# Patient Record
Sex: MALE | Race: WHITE | Employment: FULL TIME | URBAN - METROPOLITAN AREA
[De-identification: names, ages, dates, MRNs, and addresses within clinical notes are randomized per-mention and may not be internally consistent; named-entity substitution may affect disease eponyms.]

---

## 2021-10-09 ENCOUNTER — HOSPITAL ENCOUNTER (OUTPATIENT)
Age: 56
Discharge: HOME | End: 2021-10-09

## 2021-10-09 LAB
AFP: 1.8 UG/L
HCG QUANTITATIVE: 2 IU/L
LACTATE DEHYDROGENASE: 215 U/L (ref 135–225)

## 2021-10-09 PROCEDURE — 84702 CHORIONIC GONADOTROPIN TEST: CPT

## 2021-10-09 PROCEDURE — 83615 LACTATE (LD) (LDH) ENZYME: CPT

## 2021-10-09 PROCEDURE — 36415 COLL VENOUS BLD VENIPUNCTURE: CPT

## 2021-10-09 PROCEDURE — 82105 ALPHA-FETOPROTEIN SERUM: CPT

## 2021-10-13 ENCOUNTER — TELEPHONE (OUTPATIENT)
Dept: UROLOGY | Age: 56
End: 2021-10-13

## 2021-10-13 NOTE — TELEPHONE ENCOUNTER
Patient is scheduled for surgery on 10/19 at 2:00PM and for covid testing on 10/15 at 2:00PM. Left voicemail on work # provided with dates, times and instructions and to call back to confirm.

## 2021-10-15 ENCOUNTER — HOSPITAL ENCOUNTER (OUTPATIENT)
Dept: LAB | Age: 56
Setting detail: SPECIMEN
Discharge: HOME | End: 2021-10-15

## 2021-10-15 DIAGNOSIS — Z20.822 COVID-19 RULED OUT BY LABORATORY TESTING: Primary | ICD-10-CM

## 2021-10-15 PROCEDURE — U0003 INFECTIOUS AGENT DETECTION BY NUCLEIC ACID (DNA OR RNA); SEVERE ACUTE RESPIRATORY SYNDROME CORONAVIRUS 2 (SARS-COV-2) (CORONAVIRUS DISEASE [COVID-19]), AMPLIFIED PROBE TECHNIQUE, MAKING USE OF HIGH THROUGHPUT TECHNOLOGIES AS DESCRIBED BY CMS-2020-01-R: HCPCS

## 2021-10-15 PROCEDURE — U0005 INFEC AGEN DETEC AMPLI PROBE: HCPCS

## 2021-10-16 LAB
SARS-COV-2: NORMAL
SARS-COV-2: NOT DETECTED
SOURCE: NORMAL

## 2021-10-19 ENCOUNTER — ANESTHESIA (OUTPATIENT)
Dept: OPERATING ROOM | Age: 56
End: 2021-10-19

## 2021-10-19 ENCOUNTER — ANESTHESIA EVENT (OUTPATIENT)
Dept: OPERATING ROOM | Age: 56
End: 2021-10-19

## 2021-10-19 ENCOUNTER — HOSPITAL ENCOUNTER (OUTPATIENT)
Age: 56
Setting detail: OUTPATIENT SURGERY
Discharge: HOME | End: 2021-10-19
Attending: UROLOGY | Admitting: UROLOGY

## 2021-10-19 VITALS
OXYGEN SATURATION: 98 % | DIASTOLIC BLOOD PRESSURE: 85 MMHG | RESPIRATION RATE: 20 BRPM | SYSTOLIC BLOOD PRESSURE: 118 MMHG | HEART RATE: 87 BPM | WEIGHT: 134 LBS | BODY MASS INDEX: 19.18 KG/M2 | HEIGHT: 70 IN | TEMPERATURE: 97.1 F

## 2021-10-19 VITALS — SYSTOLIC BLOOD PRESSURE: 104 MMHG | OXYGEN SATURATION: 99 % | TEMPERATURE: 96.1 F | DIASTOLIC BLOOD PRESSURE: 65 MMHG

## 2021-10-19 DIAGNOSIS — G89.18 POSTOPERATIVE PAIN: Primary | ICD-10-CM

## 2021-10-19 PROCEDURE — 2580000003 HC RX 258: Performed by: UROLOGY

## 2021-10-19 PROCEDURE — 88309 TISSUE EXAM BY PATHOLOGIST: CPT

## 2021-10-19 PROCEDURE — 2580000003 HC RX 258: Performed by: ANESTHESIOLOGY

## 2021-10-19 PROCEDURE — 6360000002 HC RX W HCPCS: Performed by: NURSE ANESTHETIST, CERTIFIED REGISTERED

## 2021-10-19 PROCEDURE — 88342 IMHCHEM/IMCYTCHM 1ST ANTB: CPT

## 2021-10-19 PROCEDURE — 3600000004 HC SURGERY LEVEL 4 BASE: Performed by: UROLOGY

## 2021-10-19 PROCEDURE — 6360000002 HC RX W HCPCS: Performed by: STUDENT IN AN ORGANIZED HEALTH CARE EDUCATION/TRAINING PROGRAM

## 2021-10-19 PROCEDURE — 88341 IMHCHEM/IMCYTCHM EA ADD ANTB: CPT

## 2021-10-19 PROCEDURE — 3700000000 HC ANESTHESIA ATTENDED CARE: Performed by: UROLOGY

## 2021-10-19 PROCEDURE — 3600000014 HC SURGERY LEVEL 4 ADDTL 15MIN: Performed by: UROLOGY

## 2021-10-19 PROCEDURE — 93005 ELECTROCARDIOGRAM TRACING: CPT | Performed by: ANESTHESIOLOGY

## 2021-10-19 PROCEDURE — 7100000010 HC PHASE II RECOVERY - FIRST 15 MIN: Performed by: UROLOGY

## 2021-10-19 PROCEDURE — 7100000000 HC PACU RECOVERY - FIRST 15 MIN: Performed by: UROLOGY

## 2021-10-19 PROCEDURE — 3700000001 HC ADD 15 MINUTES (ANESTHESIA): Performed by: UROLOGY

## 2021-10-19 PROCEDURE — 7100000001 HC PACU RECOVERY - ADDTL 15 MIN: Performed by: UROLOGY

## 2021-10-19 PROCEDURE — 2500000003 HC RX 250 WO HCPCS: Performed by: NURSE ANESTHETIST, CERTIFIED REGISTERED

## 2021-10-19 PROCEDURE — 2709999900 HC NON-CHARGEABLE SUPPLY: Performed by: UROLOGY

## 2021-10-19 PROCEDURE — 2500000003 HC RX 250 WO HCPCS: Performed by: UROLOGY

## 2021-10-19 PROCEDURE — 7100000011 HC PHASE II RECOVERY - ADDTL 15 MIN: Performed by: UROLOGY

## 2021-10-19 RX ORDER — GLYCOPYRROLATE 1 MG/5 ML
SYRINGE (ML) INTRAVENOUS PRN
Status: DISCONTINUED | OUTPATIENT
Start: 2021-10-19 | End: 2021-10-19 | Stop reason: SDUPTHER

## 2021-10-19 RX ORDER — FENTANYL CITRATE 50 UG/ML
25 INJECTION, SOLUTION INTRAMUSCULAR; INTRAVENOUS EVERY 5 MIN PRN
Status: DISCONTINUED | OUTPATIENT
Start: 2021-10-19 | End: 2021-10-19 | Stop reason: HOSPADM

## 2021-10-19 RX ORDER — MAGNESIUM HYDROXIDE 1200 MG/15ML
LIQUID ORAL CONTINUOUS PRN
Status: COMPLETED | OUTPATIENT
Start: 2021-10-19 | End: 2021-10-19

## 2021-10-19 RX ORDER — DEXAMETHASONE SODIUM PHOSPHATE 10 MG/ML
INJECTION INTRAMUSCULAR; INTRAVENOUS PRN
Status: DISCONTINUED | OUTPATIENT
Start: 2021-10-19 | End: 2021-10-19 | Stop reason: SDUPTHER

## 2021-10-19 RX ORDER — ONDANSETRON 2 MG/ML
INJECTION INTRAMUSCULAR; INTRAVENOUS PRN
Status: DISCONTINUED | OUTPATIENT
Start: 2021-10-19 | End: 2021-10-19 | Stop reason: SDUPTHER

## 2021-10-19 RX ORDER — PHENYLEPHRINE HCL IN 0.9% NACL 1 MG/10 ML
SYRINGE (ML) INTRAVENOUS PRN
Status: DISCONTINUED | OUTPATIENT
Start: 2021-10-19 | End: 2021-10-19 | Stop reason: SDUPTHER

## 2021-10-19 RX ORDER — BUPIVACAINE HYDROCHLORIDE 5 MG/ML
INJECTION, SOLUTION PERINEURAL PRN
Status: DISCONTINUED | OUTPATIENT
Start: 2021-10-19 | End: 2021-10-19 | Stop reason: ALTCHOICE

## 2021-10-19 RX ORDER — FENTANYL CITRATE 50 UG/ML
50 INJECTION, SOLUTION INTRAMUSCULAR; INTRAVENOUS EVERY 5 MIN PRN
Status: DISCONTINUED | OUTPATIENT
Start: 2021-10-19 | End: 2021-10-19 | Stop reason: HOSPADM

## 2021-10-19 RX ORDER — PROPOFOL 10 MG/ML
INJECTION, EMULSION INTRAVENOUS PRN
Status: DISCONTINUED | OUTPATIENT
Start: 2021-10-19 | End: 2021-10-19 | Stop reason: SDUPTHER

## 2021-10-19 RX ORDER — LIDOCAINE HYDROCHLORIDE 10 MG/ML
INJECTION, SOLUTION EPIDURAL; INFILTRATION; INTRACAUDAL; PERINEURAL PRN
Status: DISCONTINUED | OUTPATIENT
Start: 2021-10-19 | End: 2021-10-19 | Stop reason: SDUPTHER

## 2021-10-19 RX ORDER — FENTANYL CITRATE 50 UG/ML
INJECTION, SOLUTION INTRAMUSCULAR; INTRAVENOUS PRN
Status: DISCONTINUED | OUTPATIENT
Start: 2021-10-19 | End: 2021-10-19 | Stop reason: SDUPTHER

## 2021-10-19 RX ORDER — SODIUM CHLORIDE, SODIUM LACTATE, POTASSIUM CHLORIDE, CALCIUM CHLORIDE 600; 310; 30; 20 MG/100ML; MG/100ML; MG/100ML; MG/100ML
INJECTION, SOLUTION INTRAVENOUS CONTINUOUS
Status: DISCONTINUED | OUTPATIENT
Start: 2021-10-19 | End: 2021-10-19 | Stop reason: HOSPADM

## 2021-10-19 RX ORDER — TRAMADOL HYDROCHLORIDE 50 MG/1
50 TABLET ORAL EVERY 6 HOURS PRN
Qty: 12 TABLET | Refills: 0 | Status: SHIPPED | OUTPATIENT
Start: 2021-10-19 | End: 2021-10-22

## 2021-10-19 RX ADMIN — CEFAZOLIN 2000 MG: 10 INJECTION, POWDER, FOR SOLUTION INTRAVENOUS at 14:23

## 2021-10-19 RX ADMIN — DEXAMETHASONE SODIUM PHOSPHATE 10 MG: 10 INJECTION INTRAMUSCULAR; INTRAVENOUS at 14:33

## 2021-10-19 RX ADMIN — PROPOFOL 200 MG: 10 INJECTION, EMULSION INTRAVENOUS at 14:16

## 2021-10-19 RX ADMIN — Medication 100 MCG: at 15:00

## 2021-10-19 RX ADMIN — Medication 100 MCG: at 15:06

## 2021-10-19 RX ADMIN — PROPOFOL 100 MG: 10 INJECTION, EMULSION INTRAVENOUS at 14:17

## 2021-10-19 RX ADMIN — Medication 0.4 MG: at 14:48

## 2021-10-19 RX ADMIN — Medication 100 MCG: at 15:14

## 2021-10-19 RX ADMIN — SODIUM CHLORIDE, POTASSIUM CHLORIDE, SODIUM LACTATE AND CALCIUM CHLORIDE: 600; 310; 30; 20 INJECTION, SOLUTION INTRAVENOUS at 15:16

## 2021-10-19 RX ADMIN — LIDOCAINE HYDROCHLORIDE 50 MG: 10 INJECTION, SOLUTION EPIDURAL; INFILTRATION; INTRACAUDAL; PERINEURAL at 14:16

## 2021-10-19 RX ADMIN — SODIUM CHLORIDE, POTASSIUM CHLORIDE, SODIUM LACTATE AND CALCIUM CHLORIDE: 600; 310; 30; 20 INJECTION, SOLUTION INTRAVENOUS at 12:41

## 2021-10-19 RX ADMIN — ONDANSETRON 4 MG: 2 INJECTION, SOLUTION INTRAMUSCULAR; INTRAVENOUS at 15:02

## 2021-10-19 RX ADMIN — FENTANYL CITRATE 50 MCG: 50 INJECTION, SOLUTION INTRAMUSCULAR; INTRAVENOUS at 14:16

## 2021-10-19 ASSESSMENT — PULMONARY FUNCTION TESTS
PIF_VALUE: 10
PIF_VALUE: 7
PIF_VALUE: 6
PIF_VALUE: 10
PIF_VALUE: 10
PIF_VALUE: 11
PIF_VALUE: 10
PIF_VALUE: 7
PIF_VALUE: 4
PIF_VALUE: 10
PIF_VALUE: 0
PIF_VALUE: 10
PIF_VALUE: 10
PIF_VALUE: 1
PIF_VALUE: 9
PIF_VALUE: 10
PIF_VALUE: 1
PIF_VALUE: 8
PIF_VALUE: 6
PIF_VALUE: 2
PIF_VALUE: 10
PIF_VALUE: 10
PIF_VALUE: 6
PIF_VALUE: 10
PIF_VALUE: 10
PIF_VALUE: 3
PIF_VALUE: 1
PIF_VALUE: 10
PIF_VALUE: 10
PIF_VALUE: 6
PIF_VALUE: 10
PIF_VALUE: 14
PIF_VALUE: 6
PIF_VALUE: 6
PIF_VALUE: 10
PIF_VALUE: 25
PIF_VALUE: 2
PIF_VALUE: 10
PIF_VALUE: 13
PIF_VALUE: 12
PIF_VALUE: 10
PIF_VALUE: 33
PIF_VALUE: 9
PIF_VALUE: 2
PIF_VALUE: 10
PIF_VALUE: 10
PIF_VALUE: 6
PIF_VALUE: 10
PIF_VALUE: 4
PIF_VALUE: 1
PIF_VALUE: 10
PIF_VALUE: 10
PIF_VALUE: 8
PIF_VALUE: 10
PIF_VALUE: 7
PIF_VALUE: 12
PIF_VALUE: 2
PIF_VALUE: 12
PIF_VALUE: 7
PIF_VALUE: 6
PIF_VALUE: 4
PIF_VALUE: 10
PIF_VALUE: 10
PIF_VALUE: 9
PIF_VALUE: 7
PIF_VALUE: 9
PIF_VALUE: 11
PIF_VALUE: 10
PIF_VALUE: 9
PIF_VALUE: 10
PIF_VALUE: 2

## 2021-10-19 ASSESSMENT — LIFESTYLE VARIABLES: SMOKING_STATUS: 1

## 2021-10-19 ASSESSMENT — PAIN SCALES - GENERAL
PAINLEVEL_OUTOF10: 0
PAINLEVEL_OUTOF10: 0

## 2021-10-19 ASSESSMENT — PAIN - FUNCTIONAL ASSESSMENT: PAIN_FUNCTIONAL_ASSESSMENT: 0-10

## 2021-10-19 ASSESSMENT — PAIN DESCRIPTION - DESCRIPTORS: DESCRIPTORS: ACHING;DULL

## 2021-10-19 ASSESSMENT — ENCOUNTER SYMPTOMS: SHORTNESS OF BREATH: 0

## 2021-10-19 NOTE — ANESTHESIA POSTPROCEDURE EVALUATION
Department of Anesthesiology  Postprocedure Note    Patient: Tyrone Diego  MRN: [id number]  YOB: 1965  Date of evaluation: 10/19/2021  Time:  4:11 PM     Procedure Summary     Date: 10/19/21 Room / Location: [address]    Anesthesia Start: 5995 Anesthesia Stop: 0197    Procedure: RADICAL ORCHIECTOMY (Right ) Diagnosis: (MASS OF RIGHT TESTICLE)    Surgeons: Skye Santiago MD Responsible Provider: Angela Mike MD    Anesthesia Type: general ASA Status: 3          Anesthesia Type: general    Stuart Phase I: Stuart Score: 10    Stuart Phase II: Stuart Score: 10    Last vitals: Reviewed and per EMR flowsheets.        Anesthesia Post Evaluation    Patient location during evaluation: PACU  Patient participation: complete - patient participated  Level of consciousness: awake and alert  Pain score: 3  Airway patency: patent  Nausea & Vomiting: no nausea and no vomiting  Complications: no  Cardiovascular status: hemodynamically stable  Respiratory status: acceptable  Hydration status: stable

## 2021-10-19 NOTE — ANESTHESIA PRE PROCEDURE
Department of Anesthesiology  Preprocedure Note       Name:  Jm Leo   Age:  49 y.o.  :  1965                                          MRN:  [id number]         Date:  10/19/2021      Surgeon: Jt Alegre):  Alice Gutierrez MD    Procedure: Procedure(s):  RADICAL ORCHIECTOMY    Medications prior to admission:   Prior to Admission medications    Not on File       Current medications:    Current Facility-Administered Medications   Medication Dose Route Frequency Provider Last Rate Last Admin    lactated ringers infusion   IntraVENous Continuous Jayda Rodriguez MD           Allergies: Allergies   Allergen Reactions    Percocet [Oxycodone-Acetaminophen] Other (See Comments)     Lightheaded, shaky       Problem List:  There is no problem list on file for this patient.       Past Medical History:        Diagnosis Date    COPD (chronic obstructive pulmonary disease) ([facility] [address]     Mass of right testicle     Unexplained weight loss     30 lbs over 6 months, unintentional    Wellness examination     ON 10/15/2021, PT REPORTS NO PCP       Past Surgical History:        Procedure Laterality Date    CERVICAL FUSION  2018    at [address] [place] [address]      work accident;  done at [facility] [address]        Social History:    Social History     Tobacco Use    Smoking status: Current Some Day Smoker     Packs/day: 1.00     Years: 38.00     Pack years: 38.00     Types: Cigarettes    Smokeless tobacco: Never Used   Substance Use Topics    Alcohol use: Not Currently     Comment: \"clean for 5 yrs\" as of 10/15/2021                                Ready to quit: Not Answered  Counseling given: Not Answered      Vital Signs (Current):   Vitals:    10/15/21 1144 10/19/21 1216 10/19/21 1225   BP:   122/76   Pulse:   54   Resp:   16   Temp:   97.2 °F (36.2 °C)   TempSrc:   Temporal   SpO2:   99%   Weight: 132 lb (59.9 kg) 134 lb (60.8 kg)    Height: 5' 10\" (1.778 m) 5' 10\" (1.778 m) BP Readings from Last 3 Encounters:   10/19/21 122/76       NPO Status: Time of last liquid consumption: 2330                        Time of last solid consumption: 2330                        Date of last liquid consumption: 10/18/21                        Date of last solid food consumption: 10/18/21    BMI:   Wt Readings from Last 3 Encounters:   10/19/21 134 lb (60.8 kg)     Body mass index is 19.23 kg/m². CBC: No results found for: WBC, RBC, HGB, HCT, MCV, RDW, PLT    CMP: No results found for: NA, K, CL, CO2, BUN, CREATININE, GFRAA, AGRATIO, LABGLOM, GLUCOSE, PROT, CALCIUM, BILITOT, ALKPHOS, AST, ALT    POC Tests: No results for input(s): POCGLU, POCNA, POCK, POCCL, POCBUN, POCHEMO, POCHCT in the last 72 hours.     Coags: No results found for: PROTIME, INR, APTT    HCG (If Applicable):   Lab Results   Component Value Date    HCGQUANT 2 10/09/2021        ABGs: No results found for: PHART, PO2ART, SYW0TMZ, AOY1MZA, BEART, O1WQMJXK     Type & Screen (If Applicable):  No results found for: LABABO, LABRH    Drug/Infectious Status (If Applicable):  No results found for: HIV, HEPCAB    COVID-19 Screening (If Applicable):   Lab Results   Component Value Date    COVID19 Not Detected 10/15/2021           Anesthesia Evaluation  Patient summary reviewed and Nursing notes reviewed no history of anesthetic complications:   Airway: Mallampati: III  TM distance: >3 FB   Neck ROM: full  Mouth opening: > = 3 FB Dental:          Pulmonary:normal exam  breath sounds clear to auscultation  (+) COPD:  current smoker    (-) asthma, shortness of breath, recent URI and sleep apnea                           Cardiovascular:Negative CV ROS  Exercise tolerance: good (>4 METS),       (-) hypertension, past MI, CAD, CABG/stent,  angina,  CHF, orthopnea and  SANCHEZ      Rhythm: regular  Rate: normal                    Neuro/Psych:   Negative Neuro/Psych ROS     (-) seizures, TIA and CVA

## 2021-10-19 NOTE — OP NOTE
Operative Note      Patient: Marco Pitts  YOB: 1965  MRN: [id number]    Date of Procedure: 10/19/2021    Pre-Op Diagnosis: RIGHT TESTICULAR MASS    Post-Op Diagnosis: Same       Procedure(s):  RADICAL ORCHIECTOMY (INGUINAL - RIGHT)    Surgeon(s):  Ciara Briggs MD    Assistant:   Resident: Myrna Hi MD, Philip Rush MD    Anesthesia: General    Estimated Blood Loss (mL): 5 cc     Complications: None    Specimens:   ID Type Source Tests Collected by Time Destination   A : RIGHT TESTICLE Tissue Testis SURGICAL Ольга Hernández MD 10/19/2021 1448        Implants:  * No implants in log *      Drains: * No LDAs found *    Findings: Right testicular mass, mesh from prior inguinal hernia repair     Indications:  Patient is a 57-year-old male with a right testicular mass. Beta-hCG 2, AFP 1.8, and LDH was 215. He was offered radical inguinal orchiectomy for diagnosis and treatment. Risks, benefits, and alternatives were discussed with the patient and he elected to proceed. Informed consent was obtained.     Detailed Description of Procedure:   Patient was taken to the operating room and transferred to the operating room table. General anesthesia was induced appropriately and preoperative antibiotics consisting of Ancef 2 g were administered intravenously. EPC cuffs were placed and confirmed to be working. Patient was placed in supine position and prepped and draped in the usual sterile fashion. Surgical timeout using 2 patient identifiers was performed. We began by identifying the external ring on the RIGHT side and making a 3.5 cm inguinal incision just above the ring using a 10 blade. Dissection was carried through Kal's fascia using Bovie electrocautery. There was no clearly defined external oblique aponeurosis, however we were able to dissect through some loose inflamed tissue and identified the cord.   Medial and lateral shelving edges were freed and the ilioinguinal nerve was not identified. Previous mesh visible at the level of the internal ringRight angle hemostat was used to circumferentially dissect around the cord and get around it after which 1/4 inch Penrose was passed twice over and secured for vascular and tumor control. Cord was then dissected up to the level of the internal ring freeing the gubernacular and cremasteric attachments. We then proceeded to bluntly deliver the testicle and attached to gubernacular fibers were carefully incised using electrocautery, taking care not to violate the scrotal skin. Plane was developed and the testicle was released. Hemostasis was observed. Once were satisfied, 2 Cherie clamps were placed over the cord and the cord was cut and RIGHT testicle and cord were sent off as permanent specimen. The proximal stump was then ligated using a combination of 0 silk ligatures and 0 silk free ties leaving the ends of the free tie long in case future RPLND would need to be performed and proximal stump identification would be necessary. Once this was complete, hemostasis was observed again. Wound was copiously irrigated. We then performed a cord block and anesthetized the subcutaneous tissue using 10 cc of 0.25% Marcaine. We then proceeded to reapproximate the external fascia using running 2-0 Vicryl suture. Subcutaneous tissue was reapproximated using interrupted 3-0 Vicryl suture. Skin was then closed in a running subcuticular fashion using 4-0 Monocryl suture. Incision was cleaned and dried and Dermabond skin glue was affixed. Dry dressing and mesh underwear were applied. Procedure was then terminated. Patient tolerated procedure without complication and was taken to PACU in good and stable condition. Dr. Jill Moeller was present and scrubbed for all critical portions of the procedure.     Plan:  Patient will follow up in 1-2 weeks for wound check and discussion of pathology.     Electronically signed by Karly Wilson Lee Valdez MD on 10/19/2021 at 3:18 PM

## 2021-10-19 NOTE — H&P
[facility]  Urology History & Physical      Patient:  Pratik Luciano  MRN: [id number]  YOB: 1965    CHIEF COMPLAINT:  Right testicular mass    HISTORY OF PRESENT ILLNESS:   The patient is a 49 y.o. male with right testicular mass. He presents today for radical right orchiectomy. Patient's old records, notes and chart reviewed and summarized above. Past Medical History:    Past Medical History:   Diagnosis Date    COPD (chronic obstructive pulmonary disease) ([address] [address]     Mass of right testicle     Unexplained weight loss     30 lbs over 6 months, unintentional    Wellness examination     ON 10/15/2021, PT REPORTS NO PCP       Past Surgical History:    Past Surgical History:   Procedure Laterality Date    CERVICAL FUSION  2018    at [address]  2005    work accident;  done at [facility] [address] 2015       Medications:      Current Facility-Administered Medications:     lactated ringers infusion, , IntraVENous, Continuous, Ltanya Castleman, MD, Last Rate: 100 mL/hr at 10/19/21 1241, New Bag at 10/19/21 1241    Allergies:     Allergies   Allergen Reactions    Percocet [Oxycodone-Acetaminophen] Other (See Comments)     farshad Wing       Social History:   Social History     Socioeconomic History    Marital status:      Spouse name: Not on file    Number of children: Not on file    Years of education: Not on file    Highest education level: Not on file   Occupational History    Not on file   Tobacco Use    Smoking status: Current Some Day Smoker     Packs/day: 1.00     Years: 38.00     Pack years: 38.00     Types: Cigarettes    Smokeless tobacco: Never Used   Substance and Sexual Activity    Alcohol use: Not Currently     Comment: \"clean for 5 yrs\" as of 10/15/2021    Drug use: Never    Sexual activity: Not on file   Other Topics Concern    Not on file   Social History Narrative    Not on file Social Determinants of Health     Financial Resource Strain:     Difficulty of Paying Living Expenses:    Food Insecurity:     Worried About Running Out of Food in the Last Year:     [address] in the Last Year:    Transportation Needs:     Lack of Transportation (Medical):  Lack of Transportation (Non-Medical):    Physical Activity:     Days of Exercise per Week:     Minutes of Exercise per Session:    Stress:     Feeling of Stress :    Social Connections:     Frequency of Communication with Friends and Family:     Frequency of Social Gatherings with Friends and Family:     Attends [employer] Services:     Active Member of Clubs or Organizations:     Attends Club or Organization Meetings:     Marital Status:    Intimate Partner Violence:     Fear of Current or Ex-Partner:     Emotionally Abused:     Physically Abused:     Sexually Abused:        Family History:    Family History   Adopted: Yes       REVIEW OF SYSTEMS:  A comprehensive 14 point review of systems was obtained. Constitutional: No fatigue  Eyes: No blurry vision  Ears, nose, mouth, throat, face: No ringing in the ears; no facial droop. Respiratory: No cough or cold. Cardiovascular: No palpitations  Gastrointestinal: No diarrhea or constipation. Genitourinary: No burning with urination  Integument/Skin: No rashes  Hematologic/Lymphatic: No easy bruising  Musculoskeletal: No muscle pains  Neurologic: No weakness in the extremities. Psychiatric: No depression or suicidal thoughts. Endocrine: No heat or cold intolerances. Allergic/Immunologic: No current seasonal allergies; no skin hives. Physical Exam:    This a 49 y.o. male   Vitals:    10/19/21 1225   BP: 122/76   Pulse: 54   Resp: 16   Temp: 97.2 °F (36.2 °C)   SpO2: 99%     Constitutional: Patient in no acute distress. Neuro: alert and oriented to person place and time. Psych: Mood and affect normal.   Head: Atraumatic and normocephalic.   Neck: Trachea midline   Skin: No rashes or bruising present. Lungs: Respiratory effort normal.  Cardiovascular:  Heart rate regular. Positive radial pulses bilaterally  Abdomen: Soft, non-tender, non-distended. Neither side has CVA tenderness on exam.  Bladder non-tender and not distended.  exam deferred    Labs:  No results for input(s): WBC, HGB, HCT, MCV, PLT in the last 72 hours. No results for input(s): NA, K, CL, CO2, PHOS, BUN, CREATININE in the last 72 hours. Invalid input(s): CA    No results for input(s): COLORU, PHUR, LABCAST, WBCUA, RBCUA, MUCUS, TRICHOMONAS, YEAST, BACTERIA, CLARITYU, SPECGRAV, LEUKOCYTESUR, UROBILINOGEN, [place] in the last 72 hours. Invalid input(s): NITRATE, GLUCOSEUKETONESUAMORPHOUS    Culture Results:  @[facility]@      -----------------------------------------------------------------  Imaging Results:  No results found.     Assessment and Plan   Impression:   Right testicular mass      Plan:  Radical right orchiectomy    Mike Alves DO  Urology Resident, PGY2  1:05 PM 10/19/2021

## 2021-10-20 LAB
EKG ATRIAL RATE: 51 BPM
EKG P AXIS: 88 DEGREES
EKG P-R INTERVAL: 196 MS
EKG Q-T INTERVAL: 452 MS
EKG QRS DURATION: 94 MS
EKG QTC CALCULATION (BAZETT): 416 MS
EKG R AXIS: 73 DEGREES
EKG T AXIS: 90 DEGREES
EKG VENTRICULAR RATE: 51 BPM

## 2021-10-21 ENCOUNTER — TELEPHONE (OUTPATIENT)
Dept: UROLOGY | Age: 56
End: 2021-10-21

## 2021-10-21 NOTE — TELEPHONE ENCOUNTER
----- Message from Nathalia Mcarthur MD sent at 10/19/2021  3:30 PM EDT -----  Needs CT scheduled    Can follow up with one of us to discuss path in next few weeks

## 2021-10-21 NOTE — TELEPHONE ENCOUNTER
Patient is scheduled for a CT Scan on 11/1 and will follow up for post op and results on 11/12. Talked to patient and gave him the date and time. Patient confirmed and verbalized understanding.

## 2021-10-22 LAB — SURGICAL PATHOLOGY REPORT: NORMAL

## 2021-11-01 ENCOUNTER — HOSPITAL ENCOUNTER (OUTPATIENT)
Dept: CT IMAGING | Age: 56
Discharge: HOME | End: 2021-11-03

## 2021-11-01 DIAGNOSIS — N50.811 RIGHT TESTICULAR PAIN: ICD-10-CM

## 2021-11-01 DIAGNOSIS — N50.89 TESTICULAR MASS: ICD-10-CM

## 2021-11-01 DIAGNOSIS — R63.4 WEIGHT LOSS, NON-INTENTIONAL: ICD-10-CM

## 2021-11-01 PROCEDURE — 6360000004 HC RX CONTRAST MEDICATION: Performed by: UROLOGY

## 2021-11-01 PROCEDURE — 74178 CT ABD&PLV WO CNTR FLWD CNTR: CPT

## 2021-11-01 PROCEDURE — 2580000003 HC RX 258: Performed by: UROLOGY

## 2021-11-01 RX ORDER — SODIUM CHLORIDE 0.9 % (FLUSH) 0.9 %
10 SYRINGE (ML) INJECTION PRN
Status: DISCONTINUED | OUTPATIENT
Start: 2021-11-01 | End: 2021-11-04 | Stop reason: HOSPADM

## 2021-11-01 RX ORDER — 0.9 % SODIUM CHLORIDE 0.9 %
80 INTRAVENOUS SOLUTION INTRAVENOUS ONCE
Status: COMPLETED | OUTPATIENT
Start: 2021-11-01 | End: 2021-11-01

## 2021-11-01 RX ADMIN — SODIUM CHLORIDE, PRESERVATIVE FREE 10 ML: 5 INJECTION INTRAVENOUS at 09:45

## 2021-11-01 RX ADMIN — IOHEXOL 50 ML: 240 INJECTION, SOLUTION INTRATHECAL; INTRAVASCULAR; INTRAVENOUS; ORAL at 09:45

## 2021-11-01 RX ADMIN — IOPAMIDOL 75 ML: 755 INJECTION, SOLUTION INTRAVENOUS at 09:45

## 2021-11-01 RX ADMIN — SODIUM CHLORIDE 80 ML: 9 INJECTION, SOLUTION INTRAVENOUS at 09:45

## 2021-11-12 ENCOUNTER — OFFICE VISIT (OUTPATIENT)
Dept: UROLOGY | Age: 56
End: 2021-11-12

## 2021-11-12 VITALS
SYSTOLIC BLOOD PRESSURE: 117 MMHG | BODY MASS INDEX: 19.61 KG/M2 | HEIGHT: 70 IN | HEART RATE: 59 BPM | WEIGHT: 137 LBS | DIASTOLIC BLOOD PRESSURE: 69 MMHG

## 2021-11-12 DIAGNOSIS — Z90.79 HISTORY OF ORCHIECTOMY: Primary | ICD-10-CM

## 2021-11-12 LAB
BILIRUBIN, POC: NORMAL
BLOOD URINE, POC: NORMAL
CLARITY, POC: CLEAR
COLOR, POC: YELLOW
GLUCOSE URINE, POC: NORMAL
KETONES, POC: NORMAL
LEUKOCYTE EST, POC: NORMAL
NITRITE, POC: NORMAL
PH, POC: 6.5
PROTEIN, POC: NORMAL
SPECIFIC GRAVITY, POC: 1.01
UROBILINOGEN, POC: 0.2

## 2021-11-12 PROCEDURE — 81002 URINALYSIS NONAUTO W/O SCOPE: CPT | Performed by: UROLOGY

## 2021-11-12 PROCEDURE — 99024 POSTOP FOLLOW-UP VISIT: CPT | Performed by: UROLOGY

## 2021-11-12 NOTE — PROGRESS NOTES
Dr. Edgard Singleton MD MD  [facility] Urology Clinic Consultation / New Patient Visit    Patient:  Carole Peters  YOB: 1965  Date: 11/12/2021  Consult requested from No primary care provider on file. HISTORY OF PRESENT ILLNESS:   The patient is a 49 y.o. male who presents today for follow-up for the following problem(s): Right testicular mass  Overall the problem(s) : are worsening. Associated Symptoms: No dysuria, gross hematuria. Pain Severity:      Today visit:  11/12/21   RIGHT TESTICLE, RADICAL ORCHIECTOMY:   -SEMINOMA, SIZE 5.8 CM, LIMITED TO THE TESTIS, MARGINS NEGATIVE.      Summary of old records:   (Patient's old records, notes and chart reviewed and summarized above.)    Last several PSA's:  No results found for: PSA    Last total testosterone:  No results found for: TESTOSTERONE    Urinalysis today:  Results for POC orders placed in visit on 11/12/21   POCT Urinalysis no Micro   Result Value Ref Range    Color, UA yellow     Clarity, UA clear     Glucose, UA POC neg     Bilirubin, UA neg     Ketones, UA neg     Spec Grav, UA 1.015     Blood, UA POC neg     pH, UA 6.5     Protein, UA POC neg     Urobilinogen, UA 0.2     Leukocytes, UA neg     Nitrite, UA neg          Last BUN and creatinine:  No results found for: BUN  No results found for: CREATININE    Imaging Reviewed during this Office Visit:   (results were independently reviewed by physician and radiology report verified)    PAST MEDICAL, FAMILY AND SOCIAL HISTORY:  Past Medical History:   Diagnosis Date    COPD (chronic obstructive pulmonary disease) ([facility] [address]     Mass of right testicle     Unexplained weight loss     30 lbs over 6 months, unintentional    Wellness examination     ON 10/15/2021, PT REPORTS NO PCP     Past Surgical History:   Procedure Laterality Date    CERVICAL FUSION  2018    at [address]  2005    work accident;  done at [address] [facility] Right 10/19/2021    RADICAL ORCHIECTOMY (Right )    TESTICLE REMOVAL Right 10/19/2021    RADICAL ORCHIECTOMY performed by Jessie Forte MD at [address] Right 2015     Family History   Adopted: Yes     No outpatient medications have been marked as taking for the 11/12/21 encounter (Office Visit) with Oseas Baez MD.       Percocet [oxycodone-acetaminophen]  Social History     Tobacco Use   Smoking Status Current Some Day Smoker    Packs/day: 1.00    Years: 38.00    Pack years: 38.00    Types: Cigarettes   Smokeless Tobacco Never Used       Social History     Substance and Sexual Activity   Alcohol Use Not Currently    Comment: \"clean for 5 yrs\" as of 10/15/2021       REVIEW OF SYSTEMS:  Constitutional: negative  Eyes: negative  Respiratory: negative  Cardiovascular: negative  Gastrointestinal: negative  Musculoskeletal: negative  Genitourinary: negative  Skin: negative   Neurological: negative  Hematological/Lymphatic: negative  Psychological: negative    Physical Exam:    This a 49 y.o. male   Vitals:    11/12/21 0856   BP: 117/69   Pulse: 59     Constitutional: Patient in no acute distress   Neuro: alert and oriented to person place and time. Psych: Mood and affect normal.  Head: atraumatic normocephalic  Eyes: EOMi  HEENT: neck supple, trachea midline  Lungs: Respiratory effort normal  Cardiovascular:  Normal peripheral pulses  Abdomen: Soft, non-tender, non-distended, No CVA  Bladder: non-tender and not distended. FROMx4, no cyanosis clubbing edema  Skin: warm and dry      Assessment and Plan      1. History of orchiectomy           Plan:      No follow-ups on file.   ***

## 2021-11-12 NOTE — PROGRESS NOTES
Kevin Yung, [address] [facility], [address], [place]sy Quiroz, [place]. [address] Urology Clinic Consultation / Follow up Visit    Patient:  Jm Leo  YOB: 1965  Date: 11/12/2021    HISTORY OF PRESENT ILLNESS:   The patient is a 49 y.o. male who presents today for follow-up for the following problem(s): Right testicular mass  Overall the problem(s) : are worsening. Associated Symptoms: No dysuria, gross hematuria. Pain Severity:  0/10     Today visit:  11/12/21   Pt presents for follow up of testicular cancer. Sp Right orchiectomy. Today we take approximately 1 hr office visit today to discuss pathology and treatment options. We discuss observation, chemotherapy and radiation therapy, give him prognosis and risks and benefits of each therapy. The patient elects for observation, and we discuss risks of recurrence. Summary of old records:   10/19/21 Pathology of R orchiectomy   RIGHT TESTICLE, RADICAL ORCHIECTOMY:   -SEMINOMA, SIZE 5.8 CM, LIMITED TO THE TESTIS, MARGINS NEGATIVE.    T1b (no nodes removed)     Additional History: none    Last several PSA's:  No results found for: PSA    Last total testosterone:  No results found for: TESTOSTERONE    Urinalysis today:  Results for POC orders placed in visit on 11/12/21   POCT Urinalysis no Micro   Result Value Ref Range    Color, UA yellow     Clarity, UA clear     Glucose, UA POC neg     Bilirubin, UA neg     Ketones, UA neg     Spec Grav, UA 1.015     Blood, UA POC neg     pH, UA 6.5     Protein, UA POC neg     Urobilinogen, UA 0.2     Leukocytes, UA neg     Nitrite, UA neg        Last BUN and creatinine:  No results found for: BUN  No results found for: CREATININE    Imaging Reviewed during this Office Visit:   (results were independently reviewed by physician and radiology report verified)    PAST MEDICAL, FAMILY AND SOCIAL HISTORY UPDATE:  Past Medical History:   Diagnosis Date    COPD (chronic obstructive pulmonary disease) ([facility] [address]     Mass of orchiectomy           Plan:      No follow-ups on file.  -surgical pathology discussed with patient  -surveillance annual check up and physical exam, CT AP w/ and w/o contract every 6 months for appx 2 years  -pt to RTC in 6 months with CT scan and testosterone       I have discussed the care of Kinjal Nichols including pertinent history and exam findings, with the resident, PA, and or NP. I have personally seen and examined the patient, including the key elements of all parts of the encounter, which been performed by me. I agree with the assessment, plan and orders as documented by the resident, PA, and or NP (GC modifier). Please don't hesitate to call with any questions. Thank you for involving us in the care of this pleasant patient.     Dr. Halle Maynard MD, MD

## 2021-12-10 DIAGNOSIS — Z90.79 HISTORY OF ORCHIECTOMY: Primary | ICD-10-CM

## 2021-12-29 ENCOUNTER — TELEPHONE (OUTPATIENT)
Dept: UROLOGY | Age: 56
End: 2021-12-29

## 2021-12-29 NOTE — TELEPHONE ENCOUNTER
Left voicemail to let patient know that we do not have clinic on 1/7 and appt has been rescheduled with Dr Mckinley Duran on 1/28. Also if patient needs to be seen sooner to call to get rescheduled.

## 2022-01-24 ENCOUNTER — HOSPITAL ENCOUNTER (OUTPATIENT)
Age: 57
Discharge: HOME | End: 2022-01-24

## 2022-01-24 DIAGNOSIS — Z90.79 HISTORY OF ORCHIECTOMY: ICD-10-CM

## 2022-01-24 LAB — TESTOSTERONE TOTAL: 864 NG/DL (ref 220–1000)

## 2022-01-24 PROCEDURE — 84403 ASSAY OF TOTAL TESTOSTERONE: CPT

## 2022-01-24 PROCEDURE — 36415 COLL VENOUS BLD VENIPUNCTURE: CPT

## 2022-01-28 ENCOUNTER — OFFICE VISIT (OUTPATIENT)
Dept: UROLOGY | Age: 57
End: 2022-01-28

## 2022-01-28 ENCOUNTER — TELEPHONE (OUTPATIENT)
Dept: ONCOLOGY | Age: 57
End: 2022-01-28

## 2022-01-28 VITALS
SYSTOLIC BLOOD PRESSURE: 111 MMHG | HEIGHT: 70 IN | DIASTOLIC BLOOD PRESSURE: 67 MMHG | WEIGHT: 148 LBS | HEART RATE: 57 BPM | OXYGEN SATURATION: 98 % | BODY MASS INDEX: 21.19 KG/M2

## 2022-01-28 DIAGNOSIS — C62.11 MALIGNANT NEOPLASM OF DESCENDED RIGHT TESTIS (HCC): ICD-10-CM

## 2022-01-28 DIAGNOSIS — Z90.79 H/O UNILATERAL ORCHIECTOMY: Primary | ICD-10-CM

## 2022-01-28 LAB
BILIRUBIN, POC: NORMAL
BLOOD URINE, POC: NORMAL
CLARITY, POC: CLEAR
COLOR, POC: YELLOW
GLUCOSE URINE, POC: NORMAL
KETONES, POC: NORMAL
LEUKOCYTE EST, POC: NORMAL
NITRITE, POC: NORMAL
PH, POC: 6.5
PROTEIN, POC: NORMAL
SPECIFIC GRAVITY, POC: 1.05
UROBILINOGEN, POC: 0.2

## 2022-01-28 PROCEDURE — 81003 URINALYSIS AUTO W/O SCOPE: CPT | Performed by: UROLOGY

## 2022-01-28 PROCEDURE — 99214 OFFICE O/P EST MOD 30 MIN: CPT | Performed by: UROLOGY

## 2022-01-28 NOTE — TELEPHONE ENCOUNTER
Order Details  Procedure:  AMB REFERRAL TO HEMATOLOGY ONCOLOGY   Associated diagnoses: Z90.79 (ICD-10-CM) - H/O unilateral orchiectomy   C62.11 (ICD-10-CM) - Malignant neoplasm of descended right testis [facility])   Date: 1/28/2022   Provider: Anna Diamond MD   Department: [facility] UROLOGY     LVM referral in deferred workque

## 2022-01-28 NOTE — PROGRESS NOTES
Marianne Courtney MD   Urology Clinic Office visit      Patient:  Glenn Vernon  YOB: 1965  Date: 1/28/2022    HISTORY OF PRESENT ILLNESS:   The patient is a 49 y.o. male who presents today for follow-up for the following problem(s):      1. H/O unilateral orchiectomy    2. Malignant neoplasm of descended right testis (HCC)           Overall the problem(s) : show no change. Associated Symptoms: No dysuria, gross hematuria. Pain Severity: Pain Score:   0 - No pain    Summary of old records:   Pt presents for follow up of testicular cancer. Sp Right orchiectomy. Today we take approximately 1 hr office visit today to discuss pathology and treatment options. We discuss observation, chemotherapy and radiation therapy, give him prognosis and risks and benefits of each therapy. The patient elects for observation, and we discuss risks of recurrence.      Summary of old records:   10/19/21 Pathology of R orchiectomy   RIGHT TESTICLE, RADICAL ORCHIECTOMY:   -SEMINOMA, SIZE 5.8 CM, LIMITED TO THE TESTIS, MARGINS NEGATIVE. T1b (no nodes removed)      I independently reviewed and verified the images and reports from:    No results found.       Last several PSA's:  No results found for: PSA    Last total testosterone:  Lab Results   Component Value Date    TESTOSTERONE 864 01/24/2022       Urinalysis today:  Results for POC orders placed in visit on 01/28/22   POCT Urinalysis No Micro (Auto)   Result Value Ref Range    Color, UA yellow     Clarity, UA clear     Glucose, UA POC neg     Bilirubin, UA neg     Ketones, UA neg     Spec Grav, UA 1.050     Blood, UA POC neg     pH, UA 6.5     Protein, UA POC neg     Urobilinogen, UA 0.2     Leukocytes, UA neg     Nitrite, UA neg        Last BUN and creatinine:  No results found for: BUN  No results found for: CREATININE    Imaging Reviewed during this Office Visit:   (results were independently reviewed by physician and radiology report verified)    PAST MEDICAL, FAMILY AND SOCIAL HISTORY UPDATE:  Past Medical History:   Diagnosis Date    COPD (chronic obstructive pulmonary disease) ([address]     Mass of right testicle     Unexplained weight loss     30 lbs over 6 months, unintentional    Wellness examination     ON 10/15/2021, PT REPORTS NO PCP     Past Surgical History:   Procedure Laterality Date    CERVICAL FUSION  2018    at [address]  2005    work accident;  done at [address] Right 10/19/2021    [facility] (Right )    TESTICLE REMOVAL Right 10/19/2021    RADICAL ORCHIECTOMY performed by Lisa Alexander MD at [facility] Right 2015     Family History   Adopted: Yes     No outpatient medications have been marked as taking for the 1/28/22 encounter (Office Visit) with Lisa Alexander MD.       Percocet [oxycodone-acetaminophen]  Social History     Tobacco Use   Smoking Status Current Some Day Smoker    Packs/day: 1.00    Years: 38.00    Pack years: 38.00    Types: Cigarettes   Smokeless Tobacco Never Used       Social History     Substance and Sexual Activity   Alcohol Use Not Currently    Comment: \"clean for 5 yrs\" as of 10/15/2021       REVIEW OF SYSTEMS:  Constitutional: negative  Eyes: negative  Respiratory: negative  Cardiovascular: negative  Gastrointestinal: negative  Musculoskeletal: negative  Genitourinary: negative except for what is in HPI  Skin: negative   Neurological: negative  Hematological/Lymphatic: negative  Psychological: negative    Physical Exam:      Vitals:    01/28/22 0927   BP: 111/67   Pulse: 57   SpO2: 98%     Patient is a 49 y.o. male in no acute distress and alert and oriented to person, place and time. NAD, A/o  Non labored respiration  Normal peripheral pulses  Skin- warm and dry  Psych- normal mood and affect      Assessment and Plan      1. H/O unilateral orchiectomy    2.  Malignant neoplasm of descended right testis [facility])         Patient is doing well from a surgical perspective. CT abdomen pelvis done 1 month postop was unremarkable. We will obtain a CT abdomen and pelvis for surveillance as well as CBC, BMP and refer patient to medical oncology for further management, discussion regarding possible chemotherapy. Upon review of patient's chart today, his testosterone is 864 which is abnormally high in setting of unilateral testicle. Thus, we will repeat testosterone      Patient is surgically doing well status post orchiectomy. We will have him see medical oncology for future management surveillance. The patient feels better since removing the testicle and having more energy. Follow-up as needed with urology if there is a surgical issue. Prescriptions Ordered:  No orders of the defined types were placed in this encounter.      Orders Placed:  Orders Placed This Encounter   Procedures    CT ABDOMEN PELVIS W IV CONTRAST Additional Contrast? None     Standing Status:   Future     Standing Expiration Date:   1/28/2023     Order Specific Question:   Additional Contrast?     Answer:   None     Order Specific Question:   Reason for exam:     Answer:   assess for retroperitoneal adenopathy    XR CHEST (SINGLE VIEW FRONTAL)     Standing Status:   Future     Standing Expiration Date:   1/28/2023    CBC     Standing Status:   Future     Standing Expiration Date:   1/28/2023    Basic Metabolic Panel     Standing Status:   Future     Standing Expiration Date:   1/28/2023    Testosterone     Standing Status:   Future     Standing Expiration Date:   1/28/2023    Lactate Dehydrogenase     Standing Status:   Future     Standing Expiration Date:   1/28/2023    hCG, Quantitative, Pregnancy     Standing Status:   Future     Standing Expiration Date:   1/28/2023    AFP Tumor Marker     Standing Status:   Future     Standing Expiration Date:   1/28/2023   Radha Bailey MD, Hematology/Oncology, [place]     Referral Priority:   Routine     Referral Type:   Eval and Treat     Referral Reason:   Specialty Services Required     Referred to Provider:   Curry Garnica MD     Requested Specialty:   Hematology and Oncology     Number of Visits Requested:   1    POCT Urinalysis No Micro (Auto)            Thomas Romero M.D, MD      I have discussed the care of this patient including pertinent history and exam findings, with the resident. I have seen and examined the patient and the key elements of all parts of the encounter have been performed by me. I agree with the assessment, plan and orders as documented by the resident.     Humberto Cabrera M.D, MD, MD Saúl Rincon M.D  [facility] Urology

## 2022-08-29 ENCOUNTER — OFFICE VISIT (OUTPATIENT)
Dept: PRIMARY CARE CLINIC | Age: 57
End: 2022-08-29
Payer: COMMERCIAL

## 2022-08-29 VITALS
BODY MASS INDEX: 20.47 KG/M2 | HEIGHT: 70 IN | WEIGHT: 143 LBS | OXYGEN SATURATION: 98 % | SYSTOLIC BLOOD PRESSURE: 110 MMHG | HEART RATE: 68 BPM | DIASTOLIC BLOOD PRESSURE: 68 MMHG

## 2022-08-29 DIAGNOSIS — Z87.891 PERSONAL HISTORY OF TOBACCO USE: ICD-10-CM

## 2022-08-29 DIAGNOSIS — Z71.6 ENCOUNTER FOR TOBACCO USE CESSATION COUNSELING: ICD-10-CM

## 2022-08-29 DIAGNOSIS — Z12.11 SCREENING FOR MALIGNANT NEOPLASM OF COLON: Primary | ICD-10-CM

## 2022-08-29 DIAGNOSIS — Z12.5 SCREENING FOR PROSTATE CANCER: ICD-10-CM

## 2022-08-29 DIAGNOSIS — Z00.00 HEALTHCARE MAINTENANCE: ICD-10-CM

## 2022-08-29 DIAGNOSIS — J44.9 CHRONIC OBSTRUCTIVE PULMONARY DISEASE, UNSPECIFIED COPD TYPE (HCC): ICD-10-CM

## 2022-08-29 DIAGNOSIS — F17.210 CIGARETTE NICOTINE DEPENDENCE, UNCOMPLICATED: ICD-10-CM

## 2022-08-29 DIAGNOSIS — F32.A DEPRESSION, UNSPECIFIED DEPRESSION TYPE: ICD-10-CM

## 2022-08-29 DIAGNOSIS — C62.90 MALIGNANT NEOPLASM OF TESTICLE, UNSPECIFIED LATERALITY, UNSPECIFIED WHETHER DESCENDED OR UNDESCENDED (HCC): ICD-10-CM

## 2022-08-29 PROCEDURE — 99204 OFFICE O/P NEW MOD 45 MIN: CPT | Performed by: PHYSICIAN ASSISTANT

## 2022-08-29 PROCEDURE — G0296 VISIT TO DETERM LDCT ELIG: HCPCS | Performed by: PHYSICIAN ASSISTANT

## 2022-08-29 RX ORDER — ALBUTEROL SULFATE 90 UG/1
2 AEROSOL, METERED RESPIRATORY (INHALATION) 4 TIMES DAILY PRN
Qty: 18 G | Refills: 0 | Status: SHIPPED | OUTPATIENT
Start: 2022-08-29

## 2022-08-29 RX ORDER — AZITHROMYCIN 250 MG/1
TABLET, FILM COATED ORAL
Qty: 1 PACKET | Refills: 0 | Status: SHIPPED | OUTPATIENT
Start: 2022-08-29 | End: 2022-09-08

## 2022-08-29 RX ORDER — PREDNISONE 10 MG/1
10 TABLET ORAL 2 TIMES DAILY
Qty: 10 TABLET | Refills: 0 | Status: SHIPPED | OUTPATIENT
Start: 2022-08-29 | End: 2022-09-03

## 2022-08-29 RX ORDER — ESCITALOPRAM OXALATE 5 MG/1
5 TABLET ORAL DAILY
Qty: 90 TABLET | Refills: 1 | Status: SHIPPED | OUTPATIENT
Start: 2022-08-29

## 2022-08-29 SDOH — ECONOMIC STABILITY: FOOD INSECURITY: WITHIN THE PAST 12 MONTHS, YOU WORRIED THAT YOUR FOOD WOULD RUN OUT BEFORE YOU GOT MONEY TO BUY MORE.: NEVER TRUE

## 2022-08-29 SDOH — ECONOMIC STABILITY: FOOD INSECURITY: WITHIN THE PAST 12 MONTHS, THE FOOD YOU BOUGHT JUST DIDN'T LAST AND YOU DIDN'T HAVE MONEY TO GET MORE.: NEVER TRUE

## 2022-08-29 ASSESSMENT — PATIENT HEALTH QUESTIONNAIRE - PHQ9
2. FEELING DOWN, DEPRESSED OR HOPELESS: 0
SUM OF ALL RESPONSES TO PHQ9 QUESTIONS 1 & 2: 0
SUM OF ALL RESPONSES TO PHQ QUESTIONS 1-9: 0
SUM OF ALL RESPONSES TO PHQ QUESTIONS 1-9: 0
1. LITTLE INTEREST OR PLEASURE IN DOING THINGS: 0
SUM OF ALL RESPONSES TO PHQ QUESTIONS 1-9: 0
SUM OF ALL RESPONSES TO PHQ QUESTIONS 1-9: 0

## 2022-08-29 ASSESSMENT — SOCIAL DETERMINANTS OF HEALTH (SDOH): HOW HARD IS IT FOR YOU TO PAY FOR THE VERY BASICS LIKE FOOD, HOUSING, MEDICAL CARE, AND HEATING?: NOT HARD AT ALL
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
## 2022-08-31 ENCOUNTER — TELEPHONE (OUTPATIENT)
Dept: ONCOLOGY | Age: 57
End: 2022-08-31

## 2022-08-31 NOTE — TELEPHONE ENCOUNTER
MINERVAM for pt to call and schedule a consult appt with Dr. Jim Thayer    Electronically signed by Adilene Monteiro on 8/31/2022 at 3:16 PM

## 2022-09-12 ENCOUNTER — TELEPHONE (OUTPATIENT)
Dept: ONCOLOGY | Age: 57
End: 2022-09-12

## 2022-09-12 ENCOUNTER — INITIAL CONSULT (OUTPATIENT)
Dept: ONCOLOGY | Age: 57
End: 2022-09-12
Payer: COMMERCIAL

## 2022-09-12 VITALS
SYSTOLIC BLOOD PRESSURE: 106 MMHG | WEIGHT: 141.8 LBS | RESPIRATION RATE: 16 BRPM | TEMPERATURE: 98.6 F | HEIGHT: 70 IN | HEART RATE: 53 BPM | BODY MASS INDEX: 20.3 KG/M2 | DIASTOLIC BLOOD PRESSURE: 62 MMHG

## 2022-09-12 DIAGNOSIS — C62.11 MALIGNANT NEOPLASM OF DESCENDED RIGHT TESTIS (HCC): Primary | ICD-10-CM

## 2022-09-12 DIAGNOSIS — Z00.00 HEALTHCARE MAINTENANCE: ICD-10-CM

## 2022-09-12 DIAGNOSIS — Z12.5 SCREENING FOR PROSTATE CANCER: ICD-10-CM

## 2022-09-12 LAB
ABSOLUTE EOS #: 0.35 K/UL (ref 0–0.44)
ABSOLUTE IMMATURE GRANULOCYTE: <0.03 K/UL (ref 0–0.3)
ABSOLUTE LYMPH #: 1.89 K/UL (ref 1.1–3.7)
ABSOLUTE MONO #: 0.47 K/UL (ref 0.1–1.2)
BASOPHILS # BLD: 1 % (ref 0–2)
BASOPHILS ABSOLUTE: 0.06 K/UL (ref 0–0.2)
EOSINOPHILS RELATIVE PERCENT: 6 % (ref 1–4)
HCT VFR BLD CALC: 43.2 % (ref 40.7–50.3)
HEMOGLOBIN: 14.9 G/DL (ref 13–17)
HEPATITIS C ANTIBODY: NONREACTIVE
HIV AG/AB: NONREACTIVE
IMMATURE GRANULOCYTES: 0 %
LYMPHOCYTES # BLD: 33 % (ref 24–43)
MCH RBC QN AUTO: 33.9 PG (ref 25.2–33.5)
MCHC RBC AUTO-ENTMCNC: 34.5 G/DL (ref 28.4–34.8)
MCV RBC AUTO: 98.2 FL (ref 82.6–102.9)
MONOCYTES # BLD: 8 % (ref 3–12)
NRBC AUTOMATED: 0 PER 100 WBC
PDW BLD-RTO: 12.1 % (ref 11.8–14.4)
PLATELET # BLD: 300 K/UL (ref 138–453)
PMV BLD AUTO: 10.3 FL (ref 8.1–13.5)
PROSTATE SPECIFIC ANTIGEN: 1.07 NG/ML
RBC # BLD: 4.4 M/UL (ref 4.21–5.77)
SEG NEUTROPHILS: 52 % (ref 36–65)
SEGMENTED NEUTROPHILS ABSOLUTE COUNT: 2.94 K/UL (ref 1.5–8.1)
WBC # BLD: 5.7 K/UL (ref 3.5–11.3)

## 2022-09-12 PROCEDURE — 99205 OFFICE O/P NEW HI 60 MIN: CPT | Performed by: INTERNAL MEDICINE

## 2022-09-12 PROCEDURE — 99202 OFFICE O/P NEW SF 15 MIN: CPT | Performed by: INTERNAL MEDICINE

## 2022-09-12 NOTE — PROGRESS NOTES
_           Mr. Shree Agee is a very pleasant 56 y.o. male with history of multiple co morbidities as listed. Patient is referred for further management of testicular cancer. Patient was doing fairly well until he started noticing enlargement of the right testicle in the fall 2021. Swelling was not responding to antibiotics so he was seen by urology and after initial work-up he had right orchiectomy on October 19, 2021. Final stage was stage T1b N0 M0. The patient was last seen by his urologist in January 2022. Patient was lost for follow-up with his urologist since then. Clinically he is doing fine. Patient denies any abdominal pain. Denies feeling lumps or bumps or enlarged lymph nodes. He had no chest pain or shortness of breath. He has history of chronic tobacco abuse and he will be having screening CT scan soon. Patient has no hemoptysis. No chest pain. No weight loss or decreased appetite. No abdominal pain. No urinary symptoms. No headaches. No other complaints. Patient smokes 1 pack/day for the last 40 years. Denies alcohol drinking. Jory Familia PAST MEDICAL HISTORY: has a past medical history of Anxiety, COPD (chronic obstructive pulmonary disease) ([address] Depression, Mass of right testicle, Unexplained weight loss, and Wellness examination. PAST SURGICAL HISTORY: has a past surgical history that includes Umbilical hernia repair (Right, 2015); Esophagus surgery (2005); cervical fusion (2018); Testicle removal (Right, 10/19/2021); and Testicle removal (Right, 10/19/2021). CURRENT MEDICATIONS:  has a current medication list which includes the following prescription(s): escitalopram and albuterol sulfate hfa. ALLERGIES:  is allergic to percocet [oxycodone-acetaminophen]. FAMILY HISTORY: Negative for any hematological or oncological conditions.      SOCIAL HISTORY:  reports that he has been appearance - well appearing, not in pain or distress  Mental status - good mood, alert and oriented  Eyes - pupils equal and reactive, extraocular eye movements intact  Ears - bilateral TM's and external ear canals normal  Nose - normal and patent, no erythema, discharge or polyps  Mouth - mucous membranes moist, pharynx normal without lesions  Neck - supple, no significant adenopathy  Lymphatics - no palpable lymphadenopathy, no hepatosplenomegaly  Chest - clear to auscultation, no wheezes, rales or rhonchi, symmetric air entry  Heart - normal rate, regular rhythm, normal S1, S2, no murmurs, rubs, clicks or gallops  Abdomen - soft, nontender, nondistended, no masses or organomegaly  No testicular masses and no inguinal lymph node enlargement  Neurological - alert, oriented, normal speech, no focal findings or movement disorder noted  Musculoskeletal - no joint tenderness, deformity or swelling  Extremities - peripheral pulses normal, no pedal edema, no clubbing or cyanosis  Skin - normal coloration and turgor, no rashes, no suspicious skin lesions noted     Review of Diagnostic data:   No results found for: WBC, HGB, HCT, MCV, PLT    Chemistry    No results found for: NA, K, CL, CO2, BUN, CREATININE, GLU No results found for: CALCIUM, ALKPHOS, AST, ALT, BILITOT       Component 10/19/21 5494    Surgical Pathology Report -- Diagnosis --   RIGHT TESTICLE, RADICAL ORCHIECTOMY:   -SEMINOMA, SIZE 5.8 CM, LIMITED TO THE TESTIS, MARGINS NEGATIVE. IMPRESSION:   Stage Ia, pT1b, N0 M0, right testicular seminoma. Tumor size 5.8 cm negative for lymphovascular invasion and normal tumor markers. Chronic tobacco abuse    PLAN: Records, labs and images were reviewed and discussed with the patient. I explained to the patient the nature of testicular cancer, staging, prognosis and treatment.   He has surgery October 2021 and he had limited follow-up with his urologist with last CT scan done in November 2021 last visit with his urologist was in January 2022. I explained to the patient the importance of close monitoring and follow-up of his condition with cancer being highly curable. Obviously his options of care were observation and active surveillance or to have adjuvant chemotherapy or adjuvant radiation. Patient chose to have active surveillance which is the preferred approach but it is extremely important that he continues to have follow-ups and he will stick to schedule of having tumor markers monitored and having CT scans done. Weekly. Patient agreed on this approach. I will arrange for CT scan of the abdomen pelvis on I will check the tumor markers alpha-fetoprotein and beta-hCG and LDH. We will see him again in about 3 to 4 months with tumor markers repeated at that time. Sooner for any problems. Patient will have chest CT scan screening because of his history of smoking and COPD. Patient was counseled about importance of tobacco cessation. Patient's questions were answered to the best of his satisfaction and he verbalized full understanding and agreement.

## 2022-09-12 NOTE — LETTER
9/12/2022        Keri Vargas  [address] [place] [id number]    Dear Keri Vargas: Your healthcare provider has ordered a low dose CT scan of the chest for lung cancer screening. Enclosed you will find information about CT lung screening and smoking cessation resources. If you are unable to keep you appointment for you CT lung screening, please call our scheduling department at [phone]    Keep in mind that CT lung screening does not take the place of smoking cessation. Please do not hesitate to contact me if you have any questions or concerns.     [address] [place] [address] Drive,      [id number] [place] [address] Lung Screening Program  [phone]

## 2022-09-12 NOTE — LETTER
_    Royce Rhodes MD    9/12/2022     Ariel Schilder, [facility] [address]    Dear Maite Martínez:    Thank you for referring Isabelle Zabala, 1965, to me for evaluation. Below are the relevant portions of my assessment and plan of care. Mr. Isabelle Zabala is a very pleasant 56 y.o. male with history of multiple co morbidities as listed. Patient is referred for further management of testicular cancer. Patient was doing fairly well until he started noticing enlargement of the right testicle in the fall 2021. Swelling was not responding to antibiotics so he was seen by urology and after initial work-up he had right orchiectomy on October 19, 2021. Final stage was stage T1b N0 M0. The patient was last seen by his urologist in January 2022. Patient was lost for follow-up with his urologist since then. Clinically he is doing fine. Patient denies any abdominal pain. Denies feeling lumps or bumps or enlarged lymph nodes. He had no chest pain or shortness of breath. He has history of chronic tobacco abuse and he will be having screening CT scan soon. Patient has no hemoptysis. No chest pain. No weight loss or decreased appetite. No abdominal pain. No urinary symptoms. No headaches. No other complaints. Patient smokes 1 pack/day for the last 40 years. Denies alcohol drinking. Rudy Bhakta PAST MEDICAL HISTORY: has a past medical history of Anxiety, COPD (chronic obstructive pulmonary disease) ([address] Depression, Mass of right testicle, Unexplained weight loss, and Wellness examination. PAST SURGICAL HISTORY: has a past surgical history that includes Umbilical hernia repair (Right, 2015); Esophagus surgery (2005); cervical fusion (2018); Testicle removal (Right, 10/19/2021); and Testicle removal (Right, 10/19/2021). CURRENT MEDICATIONS:  has a current medication list which includes the following prescription(s): escitalopram and albuterol sulfate hfa.     ALLERGIES:  is allergic to percocet [oxycodone-acetaminophen]. FAMILY HISTORY: Negative for any hematological or oncological conditions. SOCIAL HISTORY:  reports that he has been smoking cigarettes. He has a 38.00 pack-year smoking history. He has never used smokeless tobacco. He reports that he does not currently use alcohol. He reports that he does not use drugs. REVIEW OF SYSTEMS:     · General: Positive for weakness and fatigue. No unanticipated weight loss or decreased appetite. No fever or chills. · Eyes: No blurred vision, eye pain or double vision. · Ears: No hearing problems or drainage. No tinnitus. · Throat: No sore throat, problems with swallowing or dysphagia. · Respiratory: No cough, sputum or hemoptysis. Positive for shortness of breath. No pleuritic chest pain. · Cardiovascular: No chest pain, orthopnea or PND. No lower extremity edema. No palpitation. · Gastrointestinal: No problems with swallowing. No abdominal pain or bloating. No nausea or vomiting. No diarrhea or constipation. No GI bleeding. · Genitourinary: No dysuria, hematuria, frequency or urgency. · Musculoskeletal: No muscle aches or pains. No limitation of movement. No back pain. No gait disturbance, No joint complaints. · Dermatologic: No skin rashes or pruritus. No skin lesions or discolorations. · Psychiatric: No depression, anxiety, or stress or signs of schizophrenia. No change in mood or affect. · Hematologic: No history of bleeding tendency. No bruises or ecchymosis. No history of clotting problems. · Infectious disease: No fever, chills or frequent infections. · Endocrine: No polydipsia or polyuria. No temperature intolerance. · Neurologic: No headaches or dizziness. No weakness or numbness of the extremities. No changes in balance, coordination,  memory, mentation, behavior. · Allergic/Immunologic: No nasal congestion or hives. No repeated infections.        PHYSICAL EXAM:  The patient is not in acute distress. Vital signs: Blood pressure 106/62, pulse 53, temperature 98.6 °F (37 °C), temperature source Oral, resp. rate 16, height 5' 10\" (1.778 m), weight 141 lb 12.8 oz (64.3 kg). General appearance - well appearing, not in pain or distress  Mental status - good mood, alert and oriented  Eyes - pupils equal and reactive, extraocular eye movements intact  Ears - bilateral TM's and external ear canals normal  Nose - normal and patent, no erythema, discharge or polyps  Mouth - mucous membranes moist, pharynx normal without lesions  Neck - supple, no significant adenopathy  Lymphatics - no palpable lymphadenopathy, no hepatosplenomegaly  Chest - clear to auscultation, no wheezes, rales or rhonchi, symmetric air entry  Heart - normal rate, regular rhythm, normal S1, S2, no murmurs, rubs, clicks or gallops  Abdomen - soft, nontender, nondistended, no masses or organomegaly  Neurological - alert, oriented, normal speech, no focal findings or movement disorder noted  Musculoskeletal - no joint tenderness, deformity or swelling  Extremities - peripheral pulses normal, no pedal edema, no clubbing or cyanosis  Skin - normal coloration and turgor, no rashes, no suspicious skin lesions noted     Review of Diagnostic data:   No results found for: WBC, HGB, HCT, MCV, PLT    Chemistry    No results found for: NA, K, CL, CO2, BUN, CREATININE, GLU No results found for: CALCIUM, ALKPHOS, AST, ALT, BILITOT       Component 10/19/21 0834    Surgical Pathology Report -- Diagnosis --   RIGHT TESTICLE, RADICAL ORCHIECTOMY:   -SEMINOMA, SIZE 5.8 CM, LIMITED TO THE TESTIS, MARGINS NEGATIVE. IMPRESSION:   Stage Ia, pT1b, N0 M0, right testicular seminoma. Tumor size 5.8 cm negative for lymphovascular invasion and normal tumor markers. Chronic tobacco abuse    PLAN: Records, labs and images were reviewed and discussed with the patient.  I explained to the patient the nature of testicular cancer, staging, prognosis and

## 2022-09-13 ENCOUNTER — TELEPHONE (OUTPATIENT)
Dept: ONCOLOGY | Age: 57
End: 2022-09-13

## 2022-09-13 LAB
ALBUMIN SERPL-MCNC: 4.2 G/DL (ref 3.5–5.2)
ALBUMIN/GLOBULIN RATIO: 1.8 (ref 1–2.5)
ALP BLD-CCNC: 97 U/L (ref 40–129)
ALT SERPL-CCNC: 11 U/L (ref 5–41)
ANION GAP SERPL CALCULATED.3IONS-SCNC: 19 MMOL/L (ref 9–17)
AST SERPL-CCNC: 12 U/L
BILIRUB SERPL-MCNC: 0.4 MG/DL (ref 0.3–1.2)
BUN BLDV-MCNC: 10 MG/DL (ref 6–20)
CALCIUM SERPL-MCNC: 9.3 MG/DL (ref 8.6–10.4)
CHLORIDE BLD-SCNC: 107 MMOL/L (ref 98–107)
CHOLESTEROL, FASTING: 176 MG/DL
CHOLESTEROL/HDL RATIO: 3.4
CO2: 20 MMOL/L (ref 20–31)
CREAT SERPL-MCNC: 0.71 MG/DL (ref 0.7–1.2)
GFR AFRICAN AMERICAN: >60 ML/MIN
GFR NON-AFRICAN AMERICAN: >60 ML/MIN
GFR SERPL CREATININE-BSD FRML MDRD: ABNORMAL ML/MIN/{1.73_M2}
GLUCOSE BLD-MCNC: 84 MG/DL (ref 70–99)
HDLC SERPL-MCNC: 52 MG/DL
LDL CHOLESTEROL: 112 MG/DL (ref 0–130)
POTASSIUM SERPL-SCNC: 4.3 MMOL/L (ref 3.7–5.3)
SODIUM BLD-SCNC: 146 MMOL/L (ref 135–144)
TOTAL PROTEIN: 6.6 G/DL (ref 6.4–8.3)
TRIGLYCERIDE, FASTING: 61 MG/DL

## 2022-09-13 NOTE — TELEPHONE ENCOUNTER
AVS from 09/12/22    Labs and CT scan soon  RV 3-4 months with labs (tumor markers) before RV    CT scheduled for 09/16/22 at 2:00 pm. Pt is having labs drawn(AFP tumor marker,Lactate Dehydrogenase,HCG) on 09/16/22.   RV scheduled for 12/05/22 at 3:15 pm.    PT was given AVS and appt schedule    Electronically signed by Elise Connelly on 9/13/2022 at 8:38 AM

## 2022-09-26 ENCOUNTER — HOSPITAL ENCOUNTER (OUTPATIENT)
Dept: CT IMAGING | Age: 57
Discharge: HOME | End: 2022-09-28
Payer: COMMERCIAL

## 2022-09-26 ENCOUNTER — HOSPITAL ENCOUNTER (OUTPATIENT)
Age: 57
Discharge: HOME | End: 2022-09-26
Payer: COMMERCIAL

## 2022-09-26 VITALS — BODY MASS INDEX: 20.04 KG/M2 | HEIGHT: 70 IN | WEIGHT: 140 LBS

## 2022-09-26 DIAGNOSIS — C62.11 MALIGNANT NEOPLASM OF DESCENDED RIGHT TESTIS (HCC): ICD-10-CM

## 2022-09-26 DIAGNOSIS — Z87.891 PERSONAL HISTORY OF TOBACCO USE: ICD-10-CM

## 2022-09-26 LAB
AFP: 1.8 UG/L
HCG QUANTITATIVE: 1 MIU/ML
LACTATE DEHYDROGENASE: 167 U/L (ref 135–225)

## 2022-09-26 PROCEDURE — 74177 CT ABD & PELVIS W/CONTRAST: CPT

## 2022-09-26 PROCEDURE — 83615 LACTATE (LD) (LDH) ENZYME: CPT

## 2022-09-26 PROCEDURE — 84702 CHORIONIC GONADOTROPIN TEST: CPT

## 2022-09-26 PROCEDURE — 36415 COLL VENOUS BLD VENIPUNCTURE: CPT

## 2022-09-26 PROCEDURE — 2580000003 HC RX 258: Performed by: INTERNAL MEDICINE

## 2022-09-26 PROCEDURE — A4641 RADIOPHARM DX AGENT NOC: HCPCS | Performed by: INTERNAL MEDICINE

## 2022-09-26 PROCEDURE — 82105 ALPHA-FETOPROTEIN SERUM: CPT

## 2022-09-26 PROCEDURE — 6360000004 HC RX CONTRAST MEDICATION: Performed by: INTERNAL MEDICINE

## 2022-09-26 PROCEDURE — 71271 CT THORAX LUNG CANCER SCR C-: CPT

## 2022-09-26 RX ORDER — 0.9 % SODIUM CHLORIDE 0.9 %
80 INTRAVENOUS SOLUTION INTRAVENOUS ONCE
Status: COMPLETED | OUTPATIENT
Start: 2022-09-26 | End: 2022-09-26

## 2022-09-26 RX ORDER — SODIUM CHLORIDE 0.9 % (FLUSH) 0.9 %
10 SYRINGE (ML) INJECTION PRN
Status: DISCONTINUED | OUTPATIENT
Start: 2022-09-26 | End: 2022-09-29 | Stop reason: HOSPADM

## 2022-09-26 RX ADMIN — BARIUM SULFATE 450 ML: 20 SUSPENSION ORAL at 08:42

## 2022-09-26 RX ADMIN — SODIUM CHLORIDE 80 ML: 9 INJECTION, SOLUTION INTRAVENOUS at 08:43

## 2022-09-26 RX ADMIN — IOPAMIDOL 100 ML: 755 INJECTION, SOLUTION INTRAVENOUS at 08:42

## 2022-09-26 RX ADMIN — SODIUM CHLORIDE, PRESERVATIVE FREE 10 ML: 5 INJECTION INTRAVENOUS at 08:43

## 2022-09-26 NOTE — RESULT ENCOUNTER NOTE
Call patient and advise that test results were okay central lobar emphysema and small nodule which we will continue to monitor  yearly.

## 2022-12-05 ENCOUNTER — OFFICE VISIT (OUTPATIENT)
Dept: ONCOLOGY | Age: 57
End: 2022-12-05
Payer: COMMERCIAL

## 2022-12-05 VITALS
HEART RATE: 60 BPM | SYSTOLIC BLOOD PRESSURE: 108 MMHG | TEMPERATURE: 96.9 F | WEIGHT: 146.9 LBS | BODY MASS INDEX: 21.08 KG/M2 | DIASTOLIC BLOOD PRESSURE: 71 MMHG

## 2022-12-05 DIAGNOSIS — C62.11 MALIGNANT NEOPLASM OF DESCENDED RIGHT TESTIS (HCC): Primary | ICD-10-CM

## 2022-12-05 PROCEDURE — 99214 OFFICE O/P EST MOD 30 MIN: CPT | Performed by: INTERNAL MEDICINE

## 2022-12-05 NOTE — PROGRESS NOTES
_        Chief Complaint   Patient presents with    Follow-up     Review status of disease    Discuss Labs     DIAGNOSIS:        Stage Ia, pT1b, N0 M0, right testicular seminoma. Tumor size 5.8 cm negative for lymphovascular invasion and normal tumor markers. Chronic tobacco abuse   CURRENT THERAPY:         Right radical orchiectomy October 19, 2021    BRIEF CASE HISTORY:      Mr. Miya Kamara is a very pleasant 56 y.o. male with history of multiple co morbidities as listed. Patient is referred for further management of testicular cancer. Patient was doing fairly well until he started noticing enlargement of the right testicle in the fall 2021. Swelling was not responding to antibiotics so he was seen by urology and after initial work-up he had right orchiectomy on October 19, 2021. Final stage was stage T1b N0 M0. The patient was last seen by his urologist in January 2022. Patient was lost for follow-up with his urologist since then. Clinically he is doing fine. Patient denies any abdominal pain. Denies feeling lumps or bumps or enlarged lymph nodes. He had no chest pain or shortness of breath. He has history of chronic tobacco abuse and he will be having screening CT scan soon. Patient has no hemoptysis. No chest pain. No weight loss or decreased appetite. No abdominal pain. No urinary symptoms. No headaches. No other complaints. Patient smokes 1 pack/day for the last 40 years. Denies alcohol drinking. .     INTERIM HISTORY:   Patient seen for follow-up testicular cancer. Clinically stable. No abdominal pain. No urinary symptoms. No nausea or vomiting. No weight loss or decreased appetite. No chest pain or shortness of breath. Patient continues to have problems with COPD. No other complaints.   PAST MEDICAL HISTORY: has a past medical history of Anxiety, COPD (chronic obstructive pulmonary disease) [facility]), Depression, Mass of right testicle, Unexplained weight loss, and Wellness examination. PAST SURGICAL HISTORY: has a past surgical history that includes Umbilical hernia repair (Right, 2015); Esophagus surgery (2005); cervical fusion (2018); Testicle removal (Right, 10/19/2021); and Testicle removal (Right, 10/19/2021). CURRENT MEDICATIONS:  has a current medication list which includes the following prescription(s): escitalopram and albuterol sulfate hfa. ALLERGIES:  is allergic to percocet [oxycodone-acetaminophen]. FAMILY HISTORY: Negative for any hematological or oncological conditions. SOCIAL HISTORY:  reports that he has been smoking cigarettes. He has a 38.00 pack-year smoking history. He has never used smokeless tobacco. He reports that he does not currently use alcohol. He reports that he does not use drugs. REVIEW OF SYSTEMS:     General: Positive for weakness and fatigue. No unanticipated weight loss or decreased appetite. No fever or chills. Eyes: No blurred vision, eye pain or double vision. Ears: No hearing problems or drainage. No tinnitus. Throat: No sore throat, problems with swallowing or dysphagia. Respiratory: No cough, sputum or hemoptysis. Positive for shortness of breath. No pleuritic chest pain. Cardiovascular: No chest pain, orthopnea or PND. No lower extremity edema. No palpitation. Gastrointestinal: No problems with swallowing. No abdominal pain or bloating. No nausea or vomiting. No diarrhea or constipation. No GI bleeding. Genitourinary: No dysuria, hematuria, frequency or urgency. Musculoskeletal: No muscle aches or pains. No limitation of movement. No back pain. No gait disturbance, No joint complaints. Dermatologic: No skin rashes or pruritus. No skin lesions or discolorations. Psychiatric: No depression, anxiety, or stress or signs of schizophrenia. No change in mood or affect. Hematologic: No history of bleeding tendency.  No bruises or ecchymosis. No history of clotting problems. Infectious disease: No fever, chills or frequent infections. Endocrine: No polydipsia or polyuria. No temperature intolerance. Neurologic: No headaches or dizziness. No weakness or numbness of the extremities. No changes in balance, coordination,  memory, mentation, behavior. Allergic/Immunologic: No nasal congestion or hives. No repeated infections. PHYSICAL EXAM:  The patient is not in acute distress. Vital signs: Blood pressure 108/71, pulse 60, temperature 96.9 °F (36.1 °C), temperature source Temporal, weight 146 lb 14.4 oz (66.6 kg).      General appearance - well appearing, not in pain or distress  Mental status - good mood, alert and oriented  Eyes - pupils equal and reactive, extraocular eye movements intact  Ears - bilateral TM's and external ear canals normal  Nose - normal and patent, no erythema, discharge or polyps  Mouth - mucous membranes moist, pharynx normal without lesions  Neck - supple, no significant adenopathy  Lymphatics - no palpable lymphadenopathy, no hepatosplenomegaly  Chest - clear to auscultation, no wheezes, rales or rhonchi, symmetric air entry  Heart - normal rate, regular rhythm, normal S1, S2, no murmurs, rubs, clicks or gallops  Abdomen - soft, nontender, nondistended, no masses or organomegaly  No testicular masses and no inguinal lymph node enlargement  Neurological - alert, oriented, normal speech, no focal findings or movement disorder noted  Musculoskeletal - no joint tenderness, deformity or swelling  Extremities - peripheral pulses normal, no pedal edema, no clubbing or cyanosis  Skin - normal coloration and turgor, no rashes, no suspicious skin lesions noted     Review of Diagnostic data:   Lab Results   Component Value Date    WBC 5.7 09/12/2022    HGB 14.9 09/12/2022    HCT 43.2 09/12/2022    MCV 98.2 09/12/2022     09/12/2022       Chemistry        Component Value Date/Time     (H) 09/12/2022 0745    K 4.3 09/12/2022 0745     09/12/2022 0745    CO2 20 09/12/2022 0745    BUN 10 09/12/2022 0745    CREATININE 0.71 09/12/2022 0745        Component Value Date/Time    CALCIUM 9.3 09/12/2022 0745    ALKPHOS 97 09/12/2022 0745    AST 12 09/12/2022 0745    ALT 11 09/12/2022 0745    BILITOT 0.4 09/12/2022 0745          Component 10/19/21 0834    Surgical Pathology Report -- Diagnosis --   RIGHT TESTICLE, RADICAL ORCHIECTOMY:   -SEMINOMA, SIZE 5.8 CM, LIMITED TO THE TESTIS, MARGINS NEGATIVE. IMPRESSION:   Stage Ia, pT1b, N0 M0, right testicular seminoma. Tumor size 5.8 cm negative for lymphovascular invasion and normal tumor markers. Chronic tobacco abuse    PLAN: Records, labs and images were reviewed and discussed with the patient. I explained to the patient the nature of testicular cancer, staging, prognosis and treatment. He has surgery October 2021 and he had limited follow-up with his urologist with last CT scan done in November 2021 last visit with his urologist was in January 2022. I explained to the patient the importance of close monitoring and follow-up of his condition with cancer being highly curable. Obviously his options of care were observation and active surveillance or to have adjuvant chemotherapy or adjuvant radiation. Patient chose to have active surveillance which is the preferred approach but it is extremely important that he continues to have follow-ups and he will stick to schedule of having tumor markers monitored and having CT scans done. CT scan and tumor markers from September 2022 were normal.    I will arrange for CT scan of the abdomen pelvis on I will check the tumor markers alpha-fetoprotein and beta-hCG and LDH before his next visit in 3 months. Patient will have chest CT scan screening because of his history of smoking and COPD. Patient was counseled about importance of tobacco cessation.   Patient's questions were answered to the best of his satisfaction and he verbalized full understanding and agreement.

## 2022-12-06 ENCOUNTER — TELEPHONE (OUTPATIENT)
Dept: ONCOLOGY | Age: 57
End: 2022-12-06

## 2022-12-06 NOTE — TELEPHONE ENCOUNTER
AVS from 12/5/22      RV 3-4 months with labs (tumor markers) and CT scans before RV    Rv scheduled for 3/6 @ 3:15 pm     Pt to schedule  ct    Pt will have labs drawn one week prior to RV    Pt was given AVS and appointment schedule    Electronically signed by Cami Garrett on 12/6/2022 at 7:51 AM

## 2023-08-30 ENCOUNTER — TELEPHONE (OUTPATIENT)
Dept: ONCOLOGY | Age: 58
End: 2023-08-30

## 2024-02-21 ENCOUNTER — HOSPITAL ENCOUNTER (EMERGENCY)
Age: 59
Discharge: HOME | End: 2024-02-21
Attending: EMERGENCY MEDICINE

## 2024-02-21 ENCOUNTER — APPOINTMENT (OUTPATIENT)
Dept: GENERAL RADIOLOGY | Age: 59
End: 2024-02-21

## 2024-02-21 VITALS
HEART RATE: 106 BPM | TEMPERATURE: 98.8 F | OXYGEN SATURATION: 97 % | HEIGHT: 71 IN | DIASTOLIC BLOOD PRESSURE: 75 MMHG | WEIGHT: 145 LBS | SYSTOLIC BLOOD PRESSURE: 124 MMHG | BODY MASS INDEX: 20.3 KG/M2 | RESPIRATION RATE: 18 BRPM

## 2024-02-21 DIAGNOSIS — J44.1 COPD EXACERBATION (HCC): Primary | ICD-10-CM

## 2024-02-21 DIAGNOSIS — J06.9 ACUTE UPPER RESPIRATORY INFECTION: ICD-10-CM

## 2024-02-21 LAB
FLUAV AG SPEC QL: NEGATIVE
FLUBV AG SPEC QL: NEGATIVE
SARS-COV-2 RDRP RESP QL NAA+PROBE: NOT DETECTED
SPECIMEN DESCRIPTION: NORMAL

## 2024-02-21 PROCEDURE — 6370000000 HC RX 637 (ALT 250 FOR IP)

## 2024-02-21 PROCEDURE — 94640 AIRWAY INHALATION TREATMENT: CPT

## 2024-02-21 PROCEDURE — 6360000002 HC RX W HCPCS

## 2024-02-21 PROCEDURE — 87635 SARS-COV-2 COVID-19 AMP PRB: CPT

## 2024-02-21 PROCEDURE — 71046 X-RAY EXAM CHEST 2 VIEWS: CPT

## 2024-02-21 PROCEDURE — 99284 EMERGENCY DEPT VISIT MOD MDM: CPT

## 2024-02-21 PROCEDURE — 87804 INFLUENZA ASSAY W/OPTIC: CPT

## 2024-02-21 RX ORDER — PREDNISONE 50 MG/1
50 TABLET ORAL DAILY
Qty: 5 TABLET | Refills: 0 | Status: SHIPPED | OUTPATIENT
Start: 2024-02-21 | End: 2024-02-26

## 2024-02-21 RX ORDER — AZITHROMYCIN 250 MG/1
TABLET, FILM COATED ORAL
Qty: 1 PACKET | Refills: 0 | Status: SHIPPED | OUTPATIENT
Start: 2024-02-21 | End: 2024-02-25

## 2024-02-21 RX ORDER — AZITHROMYCIN 250 MG/1
500 TABLET, FILM COATED ORAL ONCE
Status: COMPLETED | OUTPATIENT
Start: 2024-02-21 | End: 2024-02-21

## 2024-02-21 RX ORDER — IPRATROPIUM BROMIDE AND ALBUTEROL SULFATE 2.5; .5 MG/3ML; MG/3ML
1 SOLUTION RESPIRATORY (INHALATION)
Status: COMPLETED | OUTPATIENT
Start: 2024-02-21 | End: 2024-02-21

## 2024-02-21 RX ORDER — DEXAMETHASONE SODIUM PHOSPHATE 10 MG/ML
8 INJECTION, SOLUTION INTRAMUSCULAR; INTRAVENOUS ONCE
Status: COMPLETED | OUTPATIENT
Start: 2024-02-21 | End: 2024-02-21

## 2024-02-21 RX ADMIN — DEXAMETHASONE SODIUM PHOSPHATE 8 MG: 10 INJECTION, SOLUTION INTRAMUSCULAR; INTRAVENOUS at 22:23

## 2024-02-21 RX ADMIN — IPRATROPIUM BROMIDE AND ALBUTEROL SULFATE 1 DOSE: 2.5; .5 SOLUTION RESPIRATORY (INHALATION) at 20:38

## 2024-02-21 RX ADMIN — AZITHROMYCIN 500 MG: 250 TABLET, FILM COATED ORAL at 22:24

## 2024-02-21 ASSESSMENT — PAIN DESCRIPTION - DESCRIPTORS: DESCRIPTORS: ACHING

## 2024-02-21 ASSESSMENT — PAIN DESCRIPTION - LOCATION: LOCATION: GENERALIZED

## 2024-02-21 ASSESSMENT — PAIN SCALES - GENERAL: PAINLEVEL_OUTOF10: 8

## 2024-02-21 ASSESSMENT — PAIN - FUNCTIONAL ASSESSMENT: PAIN_FUNCTIONAL_ASSESSMENT: 0-10

## 2024-02-22 NOTE — DISCHARGE INSTRUCTIONS
Take your antibiotics and prednisone as prescribed.  Take Pepcid (famotidine - over the counter) every day while you are taking the steroids.  If you are a diabetic, you should check your blood sugar more frequently while taking prednisone.  Use your inhaler or nebulizer as prescribed, or at minimum every 4 hours while you are having an asthma attack.    Being around people that smoke, elizabeth houses, weather change can cause an asthma exacerbation.    PLEASE RETURN TO THE EMERGENCY DEPARTMENT IMMEDIATELY for worsening symptoms of shortness of breath, wheezing, change in the amount of sputum that you cough up or a change in the color of your sputum, using your inhaler more frequently or if your inhaler only lasts up to 2 hours, or if you develop any concerning symptoms such as: high fever not relieved by acetaminophen (Tylenol) and/or ibuprofen (Motrin / Advil), chills, shortness of breath, chest pain, feeling of your heart fluttering or racing, persistent nausea and/or vomiting, numbness, weakness or tingling in the arms or legs or change in color of the extremities, changes in mental status, persistent headache, blurry vision, unable to follow up with your physician, or other any other care or concern.

## 2024-02-22 NOTE — ED PROVIDER NOTES
Emergency Department         I reviewed the mid level provider's note and agree with the documented findings and we have discussed the plan of care. I have reviewed the emergency nurses triage note. I agree with the chief complaint, past medical history, past surgical history, allergies, medications, social and family history as documented unless otherwise noted below.       Lon Mccoy,   02/21/24 2023       Lon Mccoy,   02/23/24 1917

## 2024-02-29 NOTE — ED PROVIDER NOTES
[facility] EMERGENCY DEPARTMENT  Emergency Department Encounter  Mid Level Provider     Pt Name: Mike Oconnell  MRN: [id number]  Birthdate 1965  Date of evaluation: 3/2/24  PCP:  No primary care provider on file.    CHIEF COMPLAINT       Chief Complaint   Patient presents with    Cough    Congestion     Nasal congestion and cough with body aches       HISTORY OF PRESENT ILLNESS  (Location/Symptom, Timing/Onset,Context/Setting, Quality, Duration, Modifying Factors, Severity.)      Mike Oconnell is a 58 y.o. male who presents with complaints 3-5 days of URI symptoms including cough, nasal congestion, body aches, chills, and fatigue. He also endorses some increased sputum production with his cough. He has attempted over the counter medications without significant relief. He denies associated chest pain or shortness of breath. No complaints of headaches, dizziness, light headedness, syncope, or vision changes.    He does have a history of COPD for which he treats with an albuterol inhaler as needed and does actively smoke cigarettes.    PAST MEDICAL /SURGICAL / SOCIAL / FAMILY HISTORY      has a past medical history of Anxiety, COPD (chronic obstructive pulmonary disease) ([facility]), Depression, Mass of right testicle, Unexplained weight loss, and Wellness examination.     has a past surgical history that includes Umbilical hernia repair (Right, 2015); Esophagus surgery (2005); cervical fusion (2018); Testicle removal (Right, 10/19/2021); and Testicle removal (Right, 10/19/2021).    Social History     Socioeconomic History    Marital status:      Spouse name: Not on file    Number of children: Not on file    Years of education: Not on file    Highest education level: Not on file   Occupational History    Not on file   Tobacco Use    Smoking status: Some Days     Current packs/day: 1.00     Average packs/day: 1 pack/day for 38.0 years (38.0 ttl pk-yrs)     Types: Cigarettes    Smokeless

## 2024-03-02 ASSESSMENT — ENCOUNTER SYMPTOMS
EYES NEGATIVE: 1
GASTROINTESTINAL NEGATIVE: 1
WHEEZING: 1
SHORTNESS OF BREATH: 0
ALLERGIC/IMMUNOLOGIC NEGATIVE: 1
COUGH: 1
CHEST TIGHTNESS: 1

## (undated) DEVICE — SHEET, T, LAPAROTOMY, STERILE: Brand: MEDLINE

## (undated) DEVICE — SUTURE VCRL SZ 2-0 L27IN ABSRB UD L26MM SH 1/2 CIR J417H

## (undated) DEVICE — SPONGE,PEANUT,XRAY,ST,SM,3/8",5/CARD: Brand: MEDLINE INDUSTRIES, INC.

## (undated) DEVICE — DRAIN SURG PENROSE 0.5X18 IN CLOSED WND DRAINAGE PREM SIL

## (undated) DEVICE — NEPTUNE E-SEP SMOKE EVACUATION PENCIL, COATED, 70MM BLADE, PUSH BUTTON SWITCH: Brand: NEPTUNE E-SEP

## (undated) DEVICE — ATHLETIC SUPPORTER LATEX FREE, XLARGE

## (undated) DEVICE — SYRINGE IRRIG 60ML SFT PLIABLE BLB EZ TO GRP 1 HND USE W/

## (undated) DEVICE — BLADE CLIPPER GEN PURP NS

## (undated) DEVICE — SVMMC PEDS/UROLOGY MINOR PACK: Brand: MEDLINE INDUSTRIES, INC.

## (undated) DEVICE — SUTURE MCRYL SZ 4-0 L18IN ABSRB UD L16MM PC-3 3/8 CIR PRIM Y845G

## (undated) DEVICE — DRAIN SURG PENROSE 0.25X18 IN CLOSED WND DRAINAGE PREM SIL

## (undated) DEVICE — GLOVE ORTHO 7 1/2   MSG9475

## (undated) DEVICE — PATIENT RETURN ELECTRODE, SINGLE-USE, CONTACT QUALITY MONITORING, ADULT, WITH 9FT CORD, FOR PATIENTS WEIGING OVER 33LBS. (15KG): Brand: MEGADYNE

## (undated) DEVICE — GAUZE,SPONGE,FLUFF,6"X6.75",STRL,5/TRAY: Brand: MEDLINE

## (undated) DEVICE — SUTURE CHROMIC GUT SZ 3-0 L27IN ABSRB BRN L26MM SH 1/2 CIR G122H

## (undated) DEVICE — ADHESIVE SKIN CLOSURE TOP 36 CC HI VISC DERMBND MINI

## (undated) DEVICE — RAZOR SHV S STL PREP DBL SIDE FIX HD CNTOUR HNDL